# Patient Record
Sex: MALE | Race: WHITE | NOT HISPANIC OR LATINO | URBAN - NONMETROPOLITAN AREA
[De-identification: names, ages, dates, MRNs, and addresses within clinical notes are randomized per-mention and may not be internally consistent; named-entity substitution may affect disease eponyms.]

---

## 2022-06-27 ENCOUNTER — WEB ENCOUNTER (OUTPATIENT)
Dept: URBAN - NONMETROPOLITAN AREA CLINIC 2 | Facility: CLINIC | Age: 82
End: 2022-06-27

## 2022-06-27 ENCOUNTER — LAB OUTSIDE AN ENCOUNTER (OUTPATIENT)
Dept: URBAN - NONMETROPOLITAN AREA CLINIC 2 | Facility: CLINIC | Age: 82
End: 2022-06-27

## 2022-06-27 ENCOUNTER — OFFICE VISIT (OUTPATIENT)
Dept: URBAN - NONMETROPOLITAN AREA CLINIC 2 | Facility: CLINIC | Age: 82
End: 2022-06-27
Payer: MEDICARE

## 2022-06-27 VITALS
HEART RATE: 58 BPM | WEIGHT: 165.1 LBS | DIASTOLIC BLOOD PRESSURE: 76 MMHG | BODY MASS INDEX: 25.91 KG/M2 | SYSTOLIC BLOOD PRESSURE: 156 MMHG | HEIGHT: 67 IN

## 2022-06-27 DIAGNOSIS — R10.9 ABDOMINAL PAIN, UNSPECIFIED ABDOMINAL LOCATION: ICD-10-CM

## 2022-06-27 DIAGNOSIS — K57.90 DIVERTICULOSIS: ICD-10-CM

## 2022-06-27 DIAGNOSIS — Z86.010 PERSONAL HISTORY OF COLONIC POLYPS: ICD-10-CM

## 2022-06-27 DIAGNOSIS — Z85.028 HISTORY OF GASTRIC CANCER: ICD-10-CM

## 2022-06-27 PROCEDURE — 99214 OFFICE O/P EST MOD 30 MIN: CPT | Performed by: INTERNAL MEDICINE

## 2022-06-27 NOTE — HPI-TODAY'S VISIT:
6/27/2022: Gastroenterology Clinic Visit 82 year old with history of well differentiated adenocarcinoma of the stomach in 2015 s/p treatment with radiation/chemotherapy and partial gastrectomy, hypertension, anxiety, dementia, personal history of colon polyps, hyperlipidemia, bladder cancer s/p treatment, former tobacco use, GERD, CKD, diverticulosis who presents for evaluation of 6 month history of abdominal discomfort.  Pain can start in the neck and radiate to the abdomen. Evaluated by Cardiology who performed Cardiac evaluation which did not reveal a cardiac source. Pain can occur without any specific trigger and lasts several minutes before going away on its own. Frequency and severity has decreased over the past 6 months. Denies a particular body movement that triggered the symptoms.   Denies unintentional weight loss, melena, hematochezia. Has 1-3 bowel movements per day.  Most recent colonoscopy in 2015 as noted below. Most recent EGD in 2019 as noted below.   Retired but previously worked in [employer] as .   Please see below for most recent colonoscopy and EGD.  2/25/2015: Colonoscopy  Single small sessile polyp was found in the ascending colon. Polypectomy performed. Single diminutive sessile polyp was found in the cecum. Polypectomy performed. A few areas of angioectasia/AVM in the cecum. Moderately severe diverticulosis in the sigmoid colon. Appendiceal orifice not seen due to large amount of debris.   2/25/2015: Pathology from EGD Ascending Colon Polyp: Adenomatous polyp. Cecal Polyp: Adenomatous polyp.  4/9/2019: EGD Examined esophagus was normal. 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 This was traversed. The efferent limb was examined. The afferent limb was examined. The mucosa of both limbs were normal. Biopsies were taken of the stomach. The exam of the stomach was otherwise normal.  The cardia and gastric fundus were normal on retroflexion.  4/9/2019: Pathology from EGD Reactive gastropathy with epithelial reparative changes suggestive of healing erosion/ulcer.

## 2022-06-28 LAB
A/G RATIO: 1.7
ALBUMIN: 4.7
ALKALINE PHOSPHATASE: 92
ALT (SGPT): 21
AST (SGOT): 28
BASO (ABSOLUTE): 0.1
BASOS: 1
BILIRUBIN, TOTAL: 0.4
BUN/CREATININE RATIO: 16
BUN: 22
CALCIUM: 9.3
CARBON DIOXIDE, TOTAL: 25
CHLORIDE: 103
CREATININE: 1.35
EGFR: 53
EOS (ABSOLUTE): 0.8
EOS: 8
GLOBULIN, TOTAL: 2.8
GLUCOSE: 101
HEMATOCRIT: 39.8
HEMATOLOGY COMMENTS:: (no result)
HEMOGLOBIN: 12.5
IMMATURE CELLS: (no result)
IMMATURE GRANS (ABS): 0
IMMATURE GRANULOCYTES: 0
LYMPHS (ABSOLUTE): 1.7
LYMPHS: 16
MCH: 27.1
MCHC: 31.4
MCV: 86
MONOCYTES(ABSOLUTE): 1
MONOCYTES: 10
NEUTROPHILS (ABSOLUTE): 6.8
NEUTROPHILS: 65
NRBC: (no result)
PLATELETS: 284
POTASSIUM: 4.5
PROTEIN, TOTAL: 7.5
RBC: 4.62
RDW: 14.6
SODIUM: 141
WBC: 10.5

## 2022-08-04 ENCOUNTER — OFFICE VISIT (OUTPATIENT)
Dept: URBAN - NONMETROPOLITAN AREA SURGERY CENTER 1 | Facility: SURGERY CENTER | Age: 82
End: 2022-08-04

## 2022-08-08 ENCOUNTER — TELEPHONE ENCOUNTER (OUTPATIENT)
Dept: URBAN - NONMETROPOLITAN AREA CLINIC 2 | Facility: CLINIC | Age: 82
End: 2022-08-08

## 2022-08-08 ENCOUNTER — LAB OUTSIDE AN ENCOUNTER (OUTPATIENT)
Dept: URBAN - NONMETROPOLITAN AREA CLINIC 2 | Facility: CLINIC | Age: 82
End: 2022-08-08

## 2022-08-08 PROBLEM — 15634181000119107: Status: ACTIVE | Noted: 2022-08-08

## 2022-08-08 PROBLEM — 786838002: Status: ACTIVE | Noted: 2022-08-08

## 2022-08-08 PROBLEM — 722223000: Status: ACTIVE | Noted: 2022-08-08

## 2022-08-30 ENCOUNTER — LAB OUTSIDE AN ENCOUNTER (OUTPATIENT)
Dept: URBAN - NONMETROPOLITAN AREA CLINIC 2 | Facility: CLINIC | Age: 82
End: 2022-08-30

## 2022-08-30 ENCOUNTER — OFFICE VISIT (OUTPATIENT)
Dept: URBAN - METROPOLITAN AREA MEDICAL CENTER 1 | Facility: MEDICAL CENTER | Age: 82
End: 2022-08-30
Payer: MEDICARE

## 2022-08-30 DIAGNOSIS — K29.80 ACUTE DUODENITIS: ICD-10-CM

## 2022-08-30 DIAGNOSIS — K29.30 CHRONIC SUPERFICIAL GASTRITIS: ICD-10-CM

## 2022-08-30 PROCEDURE — 43239 EGD BIOPSY SINGLE/MULTIPLE: CPT | Performed by: INTERNAL MEDICINE

## 2022-09-28 ENCOUNTER — TELEPHONE ENCOUNTER (OUTPATIENT)
Dept: URBAN - NONMETROPOLITAN AREA CLINIC 2 | Facility: CLINIC | Age: 82
End: 2022-09-28

## 2022-10-13 ENCOUNTER — TELEPHONE ENCOUNTER (OUTPATIENT)
Dept: URBAN - NONMETROPOLITAN AREA CLINIC 2 | Facility: CLINIC | Age: 82
End: 2022-10-13

## 2022-10-13 ENCOUNTER — OFFICE VISIT (OUTPATIENT)
Dept: URBAN - NONMETROPOLITAN AREA CLINIC 2 | Facility: CLINIC | Age: 82
End: 2022-10-13
Payer: MEDICARE

## 2022-10-13 ENCOUNTER — ERX REFILL RESPONSE (OUTPATIENT)
Dept: URBAN - NONMETROPOLITAN AREA CLINIC 2 | Facility: CLINIC | Age: 82
End: 2022-10-13

## 2022-10-13 ENCOUNTER — WEB ENCOUNTER (OUTPATIENT)
Dept: URBAN - NONMETROPOLITAN AREA CLINIC 2 | Facility: CLINIC | Age: 82
End: 2022-10-13

## 2022-10-13 VITALS
DIASTOLIC BLOOD PRESSURE: 72 MMHG | HEART RATE: 58 BPM | BODY MASS INDEX: 25.39 KG/M2 | WEIGHT: 161.8 LBS | HEIGHT: 67 IN | SYSTOLIC BLOOD PRESSURE: 130 MMHG

## 2022-10-13 DIAGNOSIS — K57.90 DIVERTICULOSIS: ICD-10-CM

## 2022-10-13 DIAGNOSIS — I77.1 CELIAC ARTERY STENOSIS: ICD-10-CM

## 2022-10-13 DIAGNOSIS — Z85.028 HISTORY OF GASTRIC CANCER: ICD-10-CM

## 2022-10-13 DIAGNOSIS — Z86.010 PERSONAL HISTORY OF COLONIC POLYPS: ICD-10-CM

## 2022-10-13 DIAGNOSIS — K29.70 GASTRITIS WITHOUT BLEEDING, UNSPECIFIED CHRONICITY, UNSPECIFIED GASTRITIS TYPE: ICD-10-CM

## 2022-10-13 DIAGNOSIS — R10.9 ABDOMINAL PAIN, UNSPECIFIED ABDOMINAL LOCATION: ICD-10-CM

## 2022-10-13 PROCEDURE — 99214 OFFICE O/P EST MOD 30 MIN: CPT | Performed by: INTERNAL MEDICINE

## 2022-10-13 RX ORDER — PANTOPRAZOLE SODIUM 20 MG/1
1 TABLET TABLET, DELAYED RELEASE ORAL EVERY 12 HOURS
Qty: 180 TABLET | Refills: 0 | OUTPATIENT
Start: 2022-10-13

## 2022-10-13 RX ORDER — OMEPRAZOLE 20 MG/1
1 CAPSULE 30 MINUTES BEFORE MORNING MEAL CAPSULE, DELAYED RELEASE ORAL ONCE A DAY
Qty: 30 | OUTPATIENT
Start: 2022-10-13

## 2022-10-13 RX ORDER — POLYETHYLENE GLYCOL 3350, SODIUM SULFATE, SODIUM CHLORIDE, POTASSIUM CHLORIDE, ASCORBIC ACID, SODIUM ASCORBATE 140-9-5.2G
AS DIRECTED KIT ORAL AS DIRECTED
Qty: 280 GRAM | Refills: 0 | Status: ACTIVE | COMMUNITY
Start: 2022-10-13 | End: 2022-10-14

## 2022-10-13 RX ORDER — PANTOPRAZOLE SODIUM 20 MG/1
1 TABLET TABLET, DELAYED RELEASE ORAL EVERY 12 HOURS
Qty: 180 TABLET | Refills: 0 | Status: ACTIVE | COMMUNITY
Start: 2022-10-13

## 2022-10-13 RX ORDER — PANTOPRAZOLE SODIUM 40 MG/1
1 TABLET TABLET, DELAYED RELEASE ORAL ONCE A DAY
Qty: 90 | Refills: 0 | OUTPATIENT
Start: 2022-10-17

## 2022-10-13 RX ORDER — OMEPRAZOLE 20 MG/1
1 CAPSULE 30 MINUTES BEFORE MORNING MEAL CAPSULE, DELAYED RELEASE ORAL ONCE A DAY
Qty: 30 | OUTPATIENT
Start: 2022-10-13 | End: 2022-10-13

## 2022-10-13 RX ORDER — POLYETHYLENE GLYCOL 3350, SODIUM SULFATE, SODIUM CHLORIDE, POTASSIUM CHLORIDE, ASCORBIC ACID, SODIUM ASCORBATE 140-9-5.2G
AS DIRECTED KIT ORAL AS DIRECTED
Qty: 280 GRAM | Refills: 0 | OUTPATIENT
Start: 2022-10-13 | End: 2022-10-14

## 2022-10-24 LAB — HELICOBACTER PYLORI AG, EIA, STOOL: (no result)

## 2022-10-27 ENCOUNTER — DASHBOARD ENCOUNTERS (OUTPATIENT)
Age: 82
End: 2022-10-27

## 2022-10-27 PROBLEM — 16846341000119101: Status: ACTIVE | Noted: 2022-10-27

## 2022-10-27 PROBLEM — 428283002: Status: ACTIVE | Noted: 2022-07-10

## 2022-10-27 PROBLEM — 21522001: Status: ACTIVE | Noted: 2022-06-27

## 2022-10-27 PROBLEM — 473061005: Status: ACTIVE | Noted: 2022-06-27

## 2022-10-27 PROBLEM — 397881000: Status: ACTIVE | Noted: 2022-07-10

## 2022-10-27 PROBLEM — 4556007: Status: ACTIVE | Noted: 2022-10-13

## 2022-11-18 ENCOUNTER — TELEPHONE ENCOUNTER (OUTPATIENT)
Dept: URBAN - NONMETROPOLITAN AREA CLINIC 2 | Facility: CLINIC | Age: 82
End: 2022-11-18

## 2022-12-06 ENCOUNTER — OFFICE VISIT (OUTPATIENT)
Dept: URBAN - METROPOLITAN AREA MEDICAL CENTER 1 | Facility: MEDICAL CENTER | Age: 82
End: 2022-12-06

## 2023-02-09 ENCOUNTER — OFFICE VISIT (OUTPATIENT)
Dept: URBAN - NONMETROPOLITAN AREA CLINIC 13 | Facility: CLINIC | Age: 83
End: 2023-02-09